# Patient Record
Sex: FEMALE | Race: BLACK OR AFRICAN AMERICAN | NOT HISPANIC OR LATINO | Employment: OTHER | URBAN - METROPOLITAN AREA
[De-identification: names, ages, dates, MRNs, and addresses within clinical notes are randomized per-mention and may not be internally consistent; named-entity substitution may affect disease eponyms.]

---

## 2024-02-23 ENCOUNTER — APPOINTMENT (OUTPATIENT)
Dept: RHEUMATOLOGY | Facility: CLINIC | Age: 84
End: 2024-02-23
Payer: MEDICARE

## 2024-02-27 ENCOUNTER — APPOINTMENT (OUTPATIENT)
Dept: RHEUMATOLOGY | Facility: CLINIC | Age: 84
End: 2024-02-27
Payer: MEDICARE

## 2024-03-23 NOTE — PROGRESS NOTES
Subjective   Patient ID: Sarah Mike is a 83 y.o. female who presents for No chief complaint on file..    HPI  Consult  Osteoarthritis   Seen by Rheum 2016 Dr. Godoy  MRI Lumbar DDD and Spinal stenosis  Knee xray 2016 OA rajan mild to moderate   Seen by Rheum Dr. Maurer  TX gabapentin     Last seen by Dr. Maurer in   OA and Osteoporosis  Joints are ok  R elbow pain 1 year comes and goes no treatment     On Reclast #2 last mo [facility] infusions    Lab 2024 at [facility] cbc cmp normal     Date Scanned:  12/13/2022 9:51 AM       RESULT:     LUMBAR SPINE:                                  BMD: 0.981 g/cm2 g/cm2.                                  T-Score: -1.5 SD                                  Z-Score: 1.5 SD   LEFT FEMORAL NECK:                                  BMD: 0.65 g/cm2 g/cm2.                                  T-Score:-2.1 SD                                  Z-Score: -0.3 SD   LEFT HIP:                                  BMD: 0.749 g/cm2 g/cm2.                                  T-Score: -1.8 SD                                  Z-Score: -0.2 SD   RIGHT FEMORAL NECK:                                  BMD: 0.661 g/cm2 g/cm2.                                  T-Score: -2.0 SD                                  Z-Score: -0.2 SD   RIGHT HIP:                                  BMD: 0.698 g/cm2 g/cm2.                                  T-Score: -2.1 SD                                  Z-Score: -0.6 SD   DISTAL THIRD LEFT FOREARM:                                  BMD: 0.624 g/cm2 g/cm2.                                  T-Score: -1.2 SD                                  Z-Score: 2.1 SD   DISTAL THIRD RIGHT FOREARM:                                  BMD: 0.616 g/cm2 g/cm2.                                  T-Score: -1.3 SD                                  Z-Score: 2.0 SD     (FRAX)*, the estimated 10   year fracture risk for a major osteoporotic fracture is 7.9%, and for a   hip fracture is 2.3%.   On gabapenin by pain mgment   "Opal        DONALDO      No current outpatient medications on file.     No current facility-administered medications for this visit.         has a past medical history of Personal history of other diseases of the musculoskeletal system and connective tissue, Personal history of other diseases of the musculoskeletal system and connective tissue (06/30/2021), and Personal history of other diseases of the nervous system and sense organs (06/30/2021).   Past Surgical History:   Procedure Laterality Date    OTHER SURGICAL HISTORY  06/30/2021    Carpal tunnel surgery    OTHER SURGICAL HISTORY  06/30/2021    Hysterectomy          family history is not on file.  Pain Management Panel           No data to display                 There were no vitals filed for this visit.  No results found for: \"CBCDIF\", \"CMPLAS\", \"RF\", \"CCPIGGQUAL\", \"SEDRATE\", \"CRP\", \"TSH\"    PHYSICAL EXAM      NODES all stations negative  HEART regular rate and rhythm  LUNGS all fields clear  ABDOMEN   VASCULAR pulses 2+ and equal  NEURO no proximal distal muscle strength  SKIN no rash  JOINTS limitation of motion of right shoulder with crepitus bilateral shoulders  OA changes hand    PLAN  There are no diagnoses linked to this encounter.  Patient transferred rheumatologic care as Dr. Maurer has retired    He has been treating her for diffuse osteoarthritis especially of shoulders and hands  She is also treated by pain management for severe osteoarthritis of the spine    Additionally she has osteopenia and has received 2 yearly Reclast injections  Her DEXA scans have been done at [facility]  She does not need another Reclast injection and we will stop at 2 yearly injections    She takes only gabapentin prescribed by pain management for her osteoarthritis and receives an occasional corticosteroid injection in the shoulders.    There is no need to see her back every 3 months as she has been doing with Dr. Maurer and I can see her back in 6 to 12 months. "  Or as needed.

## 2024-03-25 ENCOUNTER — OFFICE VISIT (OUTPATIENT)
Dept: RHEUMATOLOGY | Facility: CLINIC | Age: 84
End: 2024-03-25
Payer: MEDICARE

## 2024-03-25 VITALS — DIASTOLIC BLOOD PRESSURE: 65 MMHG | RESPIRATION RATE: 18 BRPM | HEART RATE: 59 BPM | SYSTOLIC BLOOD PRESSURE: 145 MMHG

## 2024-03-25 DIAGNOSIS — M85.89 OSTEOPENIA OF MULTIPLE SITES: ICD-10-CM

## 2024-03-25 DIAGNOSIS — M15.9 PRIMARY OSTEOARTHRITIS INVOLVING MULTIPLE JOINTS: Primary | ICD-10-CM

## 2024-03-25 PROBLEM — M15.0 PRIMARY OSTEOARTHRITIS INVOLVING MULTIPLE JOINTS: Status: ACTIVE | Noted: 2024-03-25

## 2024-03-25 PROCEDURE — 1159F MED LIST DOCD IN RCRD: CPT | Performed by: INTERNAL MEDICINE

## 2024-03-25 PROCEDURE — 99204 OFFICE O/P NEW MOD 45 MIN: CPT | Performed by: INTERNAL MEDICINE

## 2024-03-25 RX ORDER — ROSUVASTATIN CALCIUM 10 MG/1
1 TABLET, COATED ORAL NIGHTLY
COMMUNITY
Start: 2024-02-09 | End: 2024-08-07

## 2024-03-25 RX ORDER — AMLODIPINE BESYLATE 5 MG/1
5 TABLET ORAL
COMMUNITY
Start: 2024-02-09 | End: 2024-08-07

## 2024-03-25 RX ORDER — SIMVASTATIN 40 MG/1
40 TABLET, FILM COATED ORAL
COMMUNITY
Start: 2017-05-30

## 2024-03-25 RX ORDER — ACETAMINOPHEN 500 MG
TABLET ORAL
COMMUNITY
Start: 2015-06-29

## 2024-03-25 RX ORDER — CALCIUM CARBONATE 500(1250)
1250 TABLET ORAL 3 TIMES DAILY
COMMUNITY
Start: 2015-06-29

## 2024-03-25 RX ORDER — AMOXICILLIN 500 MG
CAPSULE ORAL
COMMUNITY

## 2024-03-25 RX ORDER — GABAPENTIN 400 MG/1
400 CAPSULE ORAL 3 TIMES DAILY
COMMUNITY

## 2024-03-25 RX ORDER — ALPHA LIPOIC ACID 100 MG
CAPSULE ORAL
COMMUNITY

## 2024-03-25 RX ORDER — SYRING-NEEDL,DISP,INSUL,0.3 ML 29 G X1/2"
SYRINGE, EMPTY DISPOSABLE MISCELLANEOUS ONCE
COMMUNITY

## 2024-03-25 RX ORDER — LANCETS 28 GAUGE
EACH MISCELLANEOUS
COMMUNITY
Start: 2024-02-16

## 2024-03-25 RX ORDER — METOPROLOL TARTRATE 25 MG/1
TABLET, FILM COATED ORAL
COMMUNITY
Start: 2019-06-10

## 2024-03-25 RX ORDER — PYRIDOXINE HCL (VITAMIN B6) 100 MG
1 TABLET ORAL DAILY
COMMUNITY

## 2024-03-25 RX ORDER — GLUCOSAM/CHONDRO/HERB 149/HYAL 750-100 MG
TABLET ORAL
COMMUNITY

## 2024-03-25 RX ORDER — LATANOPROST 50 UG/ML
1 SOLUTION/ DROPS OPHTHALMIC
COMMUNITY
Start: 2014-10-23

## 2024-03-25 RX ORDER — PROPRANOLOL/HYDROCHLOROTHIAZID 40 MG-25MG
TABLET ORAL
COMMUNITY

## 2024-03-25 RX ORDER — SODIUM CHLORIDE 0.9 % (FLUSH) 0.9 %
10 SYRINGE (ML) INJECTION
COMMUNITY
Start: 2023-07-25 | End: 2024-10-23

## 2024-09-21 NOTE — PROGRESS NOTES
Subjective   Patient ID: Sarah Mike is a 83 y.o. female who presents for Follow-up (Pain in elbow right side, hands are stiff at night and morning ).    HPI  Fu last seen March 2024    Osteoarthritis hand  Some cos use volt crean  Needs DEXA will order  R elbow pain use velco wrap     Seen by Rheum 2016 Dr. Godoy  MRI Lumbar DDD and Spinal stenosis  Knee xray 2016 OA rajan mild to moderate   Seen by Rheum Dr. Maurer  TX gabapentin     Last seen by Dr. Maurer in   OA and Osteoporosis  Joints are ok  R elbow pain 1 year comes and goes no treatment     On Reclast #2 last mo [facility] infusions    Lab 2024 at [facility] cbc cmp normal     Date Scanned:  12/13/2022 9:51 AM       RESULT:     LUMBAR SPINE:                                  BMD: 0.981 g/cm2 g/cm2.                                  T-Score: -1.5 SD                                  Z-Score: 1.5 SD   LEFT FEMORAL NECK:                                  BMD: 0.65 g/cm2 g/cm2.                                  T-Score:-2.1 SD                                  Z-Score: -0.3 SD   LEFT HIP:                                  BMD: 0.749 g/cm2 g/cm2.                                  T-Score: -1.8 SD                                  Z-Score: -0.2 SD   RIGHT FEMORAL NECK:                                  BMD: 0.661 g/cm2 g/cm2.                                  T-Score: -2.0 SD                                  Z-Score: -0.2 SD   RIGHT HIP:                                  BMD: 0.698 g/cm2 g/cm2.                                  T-Score: -2.1 SD                                  Z-Score: -0.6 SD   DISTAL THIRD LEFT FOREARM:                                  BMD: 0.624 g/cm2 g/cm2.                                  T-Score: -1.2 SD                                  Z-Score: 2.1 SD   DISTAL THIRD RIGHT FOREARM:                                  BMD: 0.616 g/cm2 g/cm2.                                  T-Score: -1.3 SD                                  Z-Score: 2.0 SD     (FRAX)*, the  estimated 10   year fracture risk for a major osteoporotic fracture is 7.9%, and for a   hip fracture is 2.3%.   On gabapenin by pain mgment dr. Opal FULTON      Current Outpatient Medications   Medication Sig Dispense Refill    alpha lipoic acid 100 mg capsule Take by mouth.      calcium carbonate (Oscal) 500 mg calcium (1,250 mg) tablet Take 1 tablet (1,250 mg) by mouth 3 times a day.      cholecalciferol (Vitamin D-3) 50 mcg (2,000 unit) capsule Take by mouth.      cyanocobalamin (Vitamin B-12) 1,000 mcg tablet Take 1 tablet (1,000 mcg) by mouth once daily.      fish oil concentrate (Omega-3) 120-180 mg capsule Take by mouth.      FreeStyle Lancets 28 gauge       gabapentin (Neurontin) 400 mg capsule Take 1 capsule (400 mg) by mouth 3 times a day.      glucosam-msm-chond-hrb149-hyal 500-500-66.7 mg tablet Take by mouth.      latanoprost (Xalatan) 0.005 % ophthalmic solution Administer 1 drop into both eyes.      magnesium citrate solution Take by mouth 1 time.      metoprolol tartrate (Lopressor) 25 mg tablet Take by mouth.      multivit-min/ferrous fumarate (MULTI VITAMIN ORAL) Take 1 tablet by mouth once daily.      multivit-mineral-iron-lutein tablet Take 1 tablet by mouth once daily.      omega-3 fatty acids-fish oil 300-1,000 mg capsule Take by mouth.      pyridoxine (Vitamin B-6) 100 mg tablet Take 1 tablet (100 mg) by mouth once daily.      simvastatin (Zocor) 40 mg tablet Take 1 tablet (40 mg) by mouth.      turmeric-turmeric root extract 450-50 mg capsule Take by mouth.      amLODIPine (Norvasc) 5 mg tablet Take 1 tablet (5 mg) by mouth once daily.      rosuvastatin (Crestor) 10 mg tablet Take 1 tablet (10 mg) by mouth once daily at bedtime.      sodium chloride 0.9% (sodium chloride) flush Infuse 10 mL into a venous catheter.       No current facility-administered medications for this visit.         has a past medical history of Personal history of other diseases of the musculoskeletal system and  "connective tissue, Personal history of other diseases of the musculoskeletal system and connective tissue (06/30/2021), and Personal history of other diseases of the nervous system and sense organs (06/30/2021).   Past Surgical History:   Procedure Laterality Date    OTHER SURGICAL HISTORY  06/30/2021    Carpal tunnel surgery    OTHER SURGICAL HISTORY  06/30/2021    Hysterectomy          family history is not on file.  Pain Management Panel           No data to display                 Vitals:    09/23/24 1125   BP: 132/66   Pulse: 58   Resp: 18     No results found for: \"CBCDIF\", \"CMPLAS\", \"RF\", \"CCPIGGQUAL\", \"SEDRATE\", \"CRP\", \"TSH\"    PHYSICAL EXAM      NODES all stations negative  HEART regular rate and rhythm  LUNGS all fields clear  ABDOMEN   VASCULAR pulses 2+ and equal  NEURO no proximal distal muscle strength  SKIN no rash  JOINTS limitation of motion of right shoulder with crepitus bilateral shoulders  OA changes hand    PLAN  Diagnoses and all orders for this visit:  Primary osteoarthritis involving multiple joints  Osteopenia of multiple sites    Patient transferred rheumatologic care as Dr. Maurer has retired    He has been treating her for diffuse osteoarthritis especially of shoulders and hands  She is also treated by pain management for severe osteoarthritis of the spine    Additionally she has osteopenia and has received 2 yearly Reclast injections  Her DEXA scans have been done at [facility]  She does not need another Reclast injection and we will stop at 2 yearly injections    She takes only gabapentin prescribed by pain management for her osteoarthritis and receives an occasional corticosteroid injection in the shoulders.    Use voltaren gel for hands  DEXA at [facility] jan 2025  Cont rest meds  Velcro band R elbow         "

## 2024-09-23 ENCOUNTER — APPOINTMENT (OUTPATIENT)
Dept: RHEUMATOLOGY | Facility: CLINIC | Age: 84
End: 2024-09-23
Payer: MEDICARE

## 2024-09-23 VITALS
RESPIRATION RATE: 18 BRPM | WEIGHT: 132 LBS | BODY MASS INDEX: 22.66 KG/M2 | SYSTOLIC BLOOD PRESSURE: 132 MMHG | DIASTOLIC BLOOD PRESSURE: 66 MMHG | HEART RATE: 58 BPM

## 2024-09-23 DIAGNOSIS — M85.89 OSTEOPENIA OF MULTIPLE SITES: ICD-10-CM

## 2024-09-23 DIAGNOSIS — M77.11 LATERAL EPICONDYLITIS OF RIGHT ELBOW: ICD-10-CM

## 2024-09-23 DIAGNOSIS — M15.9 PRIMARY OSTEOARTHRITIS INVOLVING MULTIPLE JOINTS: Primary | ICD-10-CM

## 2024-09-23 PROCEDURE — 99214 OFFICE O/P EST MOD 30 MIN: CPT | Performed by: INTERNAL MEDICINE

## 2024-09-23 PROCEDURE — 1159F MED LIST DOCD IN RCRD: CPT | Performed by: INTERNAL MEDICINE

## 2024-09-23 RX ORDER — LANOLIN ALCOHOL/MO/W.PET/CERES
1000 CREAM (GRAM) TOPICAL DAILY
COMMUNITY

## 2025-03-22 NOTE — PROGRESS NOTES
Subjective   Patient ID: Sarah Mike is a 84 y.o. female who presents for No chief complaint on file..    HPI  Fu last seen 6 mos ago 9-2024    Lung cancer at [facility]  Op only never got DEXA    L hand pain OA     Osteoarthritis hand  Some cos use volt crean  Needs DEXA will order  R elbow pain use velco wrap     Seen by Rheum 2016 Dr. Godoy  MRI Lumbar DDD and Spinal stenosis  Knee xray 2016 OA rajan mild to moderate   Seen by Rheum Dr. Maurer  TX gabapentin     Last seen by Dr. Maurer in   OA and Osteoporosis  Joints are ok  R elbow pain 1 year comes and goes no treatment     On Reclast #2 last mo [facility] infusions    Lab 2024 at [facility] cbc cmp normal     Date Scanned:  12/13/2022 9:51 AM   RESULT:     LUMBAR SPINE:                                  BMD: 0.981 g/cm2 g/cm2.                                  T-Score: -1.5 SD                                  Z-Score: 1.5 SD   LEFT FEMORAL NECK:                                  BMD: 0.65 g/cm2 g/cm2.                                  T-Score:-2.1 SD                                  Z-Score: -0.3 SD   LEFT HIP:                                  BMD: 0.749 g/cm2 g/cm2.                                  T-Score: -1.8 SD                                  Z-Score: -0.2 SD   RIGHT FEMORAL NECK:                                  BMD: 0.661 g/cm2 g/cm2.                                  T-Score: -2.0 SD                                  Z-Score: -0.2 SD   RIGHT HIP:                                  BMD: 0.698 g/cm2 g/cm2.                                  T-Score: -2.1 SD                                  Z-Score: -0.6 SD   DISTAL THIRD LEFT FOREARM:                                  BMD: 0.624 g/cm2 g/cm2.                                  T-Score: -1.2 SD                                  Z-Score: 2.1 SD   DISTAL THIRD RIGHT FOREARM:                                  BMD: 0.616 g/cm2 g/cm2.                                  T-Score: -1.3 SD                                  Z-Score: 2.0 SD      (FRAX)*, the estimated 10   year fracture risk for a major osteoporotic fracture is 7.9%, and for a   hip fracture is 2.3%.   On gabapenin by pain mgment dr. Opal FULTON      Current Outpatient Medications   Medication Sig Dispense Refill    alpha lipoic acid 100 mg capsule Take by mouth.      amLODIPine (Norvasc) 5 mg tablet Take 1 tablet (5 mg) by mouth once daily.      calcium carbonate (Oscal) 500 mg calcium (1,250 mg) tablet Take 1 tablet (1,250 mg) by mouth 3 times a day.      cholecalciferol (Vitamin D-3) 50 mcg (2,000 unit) capsule Take by mouth.      cyanocobalamin (Vitamin B-12) 1,000 mcg tablet Take 1 tablet (1,000 mcg) by mouth once daily.      fish oil concentrate (Omega-3) 120-180 mg capsule Take by mouth.      FreeStyle Lancets 28 gauge       gabapentin (Neurontin) 400 mg capsule Take 1 capsule (400 mg) by mouth 3 times a day.      glucosam-[facility]-chond-hrb149-hyal 500-500-66.7 mg tablet Take by mouth.      latanoprost (Xalatan) 0.005 % ophthalmic solution Administer 1 drop into both eyes.      magnesium citrate solution Take by mouth 1 time.      metoprolol tartrate (Lopressor) 25 mg tablet Take by mouth.      multivit-min/ferrous fumarate (MULTI VITAMIN ORAL) Take 1 tablet by mouth once daily.      multivit-mineral-iron-lutein tablet Take 1 tablet by mouth once daily.      omega-3 fatty acids-fish oil 300-1,000 mg capsule Take by mouth.      pyridoxine (Vitamin B-6) 100 mg tablet Take 1 tablet (100 mg) by mouth once daily.      rosuvastatin (Crestor) 10 mg tablet Take 1 tablet (10 mg) by mouth once daily at bedtime.      simvastatin (Zocor) 40 mg tablet Take 1 tablet (40 mg) by mouth.      turmeric-turmeric root extract 450-50 mg capsule Take by mouth.       No current facility-administered medications for this visit.         has a past medical history of Personal history of other diseases of the musculoskeletal system and connective tissue, Personal history of other diseases of the  "musculoskeletal system and connective tissue (06/30/2021), and Personal history of other diseases of the nervous system and sense organs (06/30/2021).   Past Surgical History:   Procedure Laterality Date    OTHER SURGICAL HISTORY  06/30/2021    Carpal tunnel surgery    OTHER SURGICAL HISTORY  06/30/2021    Hysterectomy          family history is not on file.  Pain Management Panel           No data to display                 There were no vitals filed for this visit.    No results found for: \"CBCDIF\", \"CMPLAS\", \"RF\", \"CCPIGGQUAL\", \"SEDRATE\", \"CRP\", \"TSH\"    PHYSICAL EXAM      NODES all stations negative  HEART regular rate and rhythm  LUNGS all fields clear  ABDOMEN   VASCULAR pulses 2+ and equal  NEURO no proximal distal muscle strength  SKIN no rash  JOINTS limitation of motion of right shoulder with crepitus bilateral shoulders  OA changes hand    PLAN  There are no diagnoses linked to this encounter.    Patient transferred rheumatologic care as Dr. Maurer has retired    He has been treating her for diffuse osteoarthritis especially of shoulders and hands  She is also treated by pain management for severe osteoarthritis of the spine    Additionally she has osteopenia and has received 2 yearly Reclast injections  Her DEXA scans have been done at [facility]  She does not need another Reclast injection and we will stop at 2 yearly injections    Will order DEXA here     Use voltaren gel for hands      "

## 2025-03-24 ENCOUNTER — APPOINTMENT (OUTPATIENT)
Dept: RHEUMATOLOGY | Facility: CLINIC | Age: 85
End: 2025-03-24
Payer: MEDICARE

## 2025-03-24 VITALS
BODY MASS INDEX: 22.31 KG/M2 | WEIGHT: 130 LBS | HEART RATE: 65 BPM | DIASTOLIC BLOOD PRESSURE: 79 MMHG | SYSTOLIC BLOOD PRESSURE: 136 MMHG | OXYGEN SATURATION: 100 %

## 2025-03-24 DIAGNOSIS — M15.0 PRIMARY OSTEOARTHRITIS INVOLVING MULTIPLE JOINTS: Primary | ICD-10-CM

## 2025-03-24 DIAGNOSIS — M85.89 OSTEOPENIA OF MULTIPLE SITES: ICD-10-CM

## 2025-03-24 PROCEDURE — 1159F MED LIST DOCD IN RCRD: CPT | Performed by: INTERNAL MEDICINE

## 2025-03-24 PROCEDURE — 99214 OFFICE O/P EST MOD 30 MIN: CPT | Performed by: INTERNAL MEDICINE

## 2025-04-16 ENCOUNTER — HOSPITAL ENCOUNTER (OUTPATIENT)
Dept: RADIOLOGY | Facility: CLINIC | Age: 85
Discharge: HOME | End: 2025-04-16
Payer: MEDICARE

## 2025-04-16 DIAGNOSIS — M15.0 PRIMARY OSTEOARTHRITIS INVOLVING MULTIPLE JOINTS: ICD-10-CM

## 2025-04-16 DIAGNOSIS — M85.89 OSTEOPENIA OF MULTIPLE SITES: ICD-10-CM

## 2025-04-16 PROCEDURE — 77080 DXA BONE DENSITY AXIAL: CPT | Performed by: RADIOLOGY

## 2025-04-16 PROCEDURE — 77080 DXA BONE DENSITY AXIAL: CPT

## 2025-09-24 ENCOUNTER — APPOINTMENT (OUTPATIENT)
Dept: RHEUMATOLOGY | Facility: CLINIC | Age: 85
End: 2025-09-24
Payer: MEDICARE

## 2025-09-25 ENCOUNTER — APPOINTMENT (OUTPATIENT)
Dept: RHEUMATOLOGY | Facility: CLINIC | Age: 85
End: 2025-09-25
Payer: MEDICARE